# Patient Record
Sex: FEMALE | Race: OTHER | HISPANIC OR LATINO | Employment: UNEMPLOYED | URBAN - METROPOLITAN AREA
[De-identification: names, ages, dates, MRNs, and addresses within clinical notes are randomized per-mention and may not be internally consistent; named-entity substitution may affect disease eponyms.]

---

## 2019-07-12 ENCOUNTER — OFFICE VISIT (OUTPATIENT)
Dept: URGENT CARE | Age: 29
End: 2019-07-12
Payer: COMMERCIAL

## 2019-07-12 VITALS
HEIGHT: 65 IN | WEIGHT: 136 LBS | DIASTOLIC BLOOD PRESSURE: 65 MMHG | SYSTOLIC BLOOD PRESSURE: 121 MMHG | HEART RATE: 73 BPM | TEMPERATURE: 97.8 F | RESPIRATION RATE: 16 BRPM | OXYGEN SATURATION: 100 % | BODY MASS INDEX: 22.66 KG/M2

## 2019-07-12 DIAGNOSIS — R59.1 LYMPHADENOPATHY: Primary | ICD-10-CM

## 2019-07-12 PROCEDURE — G0382 LEV 3 HOSP TYPE B ED VISIT: HCPCS | Performed by: PHYSICIAN ASSISTANT

## 2019-07-12 PROCEDURE — 99203 OFFICE O/P NEW LOW 30 MIN: CPT | Performed by: PHYSICIAN ASSISTANT

## 2019-07-12 PROCEDURE — 99283 EMERGENCY DEPT VISIT LOW MDM: CPT | Performed by: PHYSICIAN ASSISTANT

## 2019-07-12 RX ORDER — MOMETASONE FUROATE 1 MG/G
CREAM TOPICAL DAILY PRN
Qty: 45 G | Refills: 1 | Status: SHIPPED | OUTPATIENT
Start: 2019-07-12 | End: 2021-09-03

## 2019-07-13 NOTE — PATIENT INSTRUCTIONS
Continue monitor symptoms  If swelling under left arm continues for 1-2 weeks, follow-up due to family doctor OBGYN  Go to ER if new or severe symptoms occur    Linfadenopatía   LO QUE NECESITA SABER:   La linfoadenopatía es ketan inflamación en racquel ganglios linfáticos  Los ganglios linfáticos son [place] pequeños que son parte de guthrie sistema inmunológico  Los ganglios linfáticos esta localizados por todo guthrie cuerpo  Se pueden sentir mejor en guthrie raven, debajo del guthrie brazos y cerca de guthrie florian  Linfoadenopatía puede ocurir en jacobo o más áreas de guthrie cuerpo  Generalmente es causada por ketan infección  [facility]CCIONES [facility]:   Regrese a la deanne de emergencias si:   · Los gánglios linfáticos inflamados están sangrando  · Los gánglios linfáticos inflamados en el raven afectan la respiración o habilidad para tragar  Pregúntele a guthrie Abbott Foyer vitaminas y minerales son adecuados para usted  · Usted tiene fiebre  · Usted tiene ketan nueva inflamación o dolor en los ganglíos linfáticos  · Usted tiene un sarpullido  · Racquel gánglios linfáticos permanecen inflamados y dolorosos o se ahn agrandado  · Racquel gánglios linfáticos tienen líneas fregoso a guthrie alrededor, o la piel alrededor de los gánglios esta enrojecida  · Usted tiene preguntas o inquietudes acerca de guthrie condición o cuidado  Acuda a racquel consultas de control con guthrie médico según le indicaron  Anote racquel preguntas para que se acuerde de hacerlas drake racquel visitas  Cuidados personales:   · No empuje ni apriete  los gánglios linfáticos que están inflamados  · Aplique calor sobre los ganglios linfáticos  Puede usar compresas tibias, o ketan almohadilla eléctrica a ketan temperatura baja  · Descanse tanto keny sea necesario  Si tiene fiebre, descanse hasta que guthrie temperatura se haya normalizado  Regrese a racquel actividades normales diarias lentamente después que haya suspendido guthrie fiebre    © 2017 [facility] is for End User's use only and may not be sold, redistributed or otherwise used for commercial purposes  All illustrations and images included in CareNotes® are the copyrighted property of A D A M , Inc  or Jhoan Delgado  Esta información es sólo para uso en educación  Gutrhie intención no es darle un consejo médico sobre enfermedades o tratamientos  Colsulte con guthrie Alben Arin farmacéutico antes de seguir cualquier régimen médico para saber si es seguro y efectivo para usted

## 2019-07-13 NOTE — PROGRESS NOTES
[facility]        NAME: Ben Galarza is a 30 y o  female  : 1990    MRN: [id number]  DATE: 2019  TIME: 8:30 PM    Assessment and Plan   Lymphadenopathy [R59 1]  1  Lymphadenopathy  mometasone (ELOCON) 0 1 % cream       Per patient, swelling and tenderness of lymph node in axilla has decreased substantially over the past few days  I educated patient that if lymph node last longer than 2 weeks, if it grows substantially, if she notices any pain or mass and breast, or any new concerning symptoms occur, follow up with her family doctor her OBGYN  I educated patient on warning signs such as firm nontender non mobile or lymph nodes that last a long time  Patient states she understands and agrees to follow up with family doctor or OBGYN  Patient Instructions       Take medications as directed  Drink plenty of fluids  Follow up with family doctor this week  Go to ER immediately if new or worsening symptoms occur  Chief Complaint     Chief Complaint   Patient presents with    Mass     PT states she feels a lump under her left armpit for about 5 days; has not tried any ointments, heat or ice  History of Present Illness       Five days ago, patient noticed a marble-sized painful swelling to left axilla  Patient states that she believes that it was deep under the skin, not abscess or a pimple  Patient states she has a history of ingrown hairs in this is nothing like that  Patient states that it was tender, mobile  Patient denies any fevers or chills  Patient denies any URI symptoms  Patient denies any infection, mass, or pain to the breasts  Patient states that in the past day to 2 days, the swelling has decreased significantly and now she has no pain  She states that it is barely palpable  Patient has done nothing to make this better    Patient can't think of nothing that makes a this worse    Patient also has a history of eczema and she needs a refill for mometasone      Review of Systems   Review of Systems   Constitutional: Negative for chills, diaphoresis, fatigue and fever  HENT: Negative for congestion, ear pain, rhinorrhea, sinus pressure and voice change  Eyes: Negative  Respiratory: Negative for cough, chest tightness and shortness of breath  Cardiovascular: Negative for chest pain and palpitations  Gastrointestinal: Negative for abdominal pain, constipation, diarrhea, nausea and vomiting  Endocrine: Negative  Genitourinary: Negative for dysuria  Musculoskeletal: Negative for back pain, myalgias and neck pain  Skin: Positive for rash (eczema of hands bilateral)  Negative for pallor  Allergic/Immunologic: Negative  Neurological: Negative for dizziness, syncope and headaches  Hematological: Negative  Psychiatric/Behavioral: Negative  Current Medications       Current Outpatient Medications:     mometasone (ELOCON) 0 1 % cream, Apply topically daily as needed (Eczema), Disp: 45 g, Rfl: 1    Current Allergies     Allergies as of 07/12/2019    (No Known Allergies)            The following portions of the patient's history were reviewed and updated as appropriate: allergies, current medications, past family history, past medical history, past social history, past surgical history and problem list      Past Medical History:   Diagnosis Date    Tachycardia        History reviewed  No pertinent surgical history  History reviewed  No pertinent family history  Medications have been verified  Objective   /65   Pulse 73   Temp 97 8 °F (36 6 °C)   Resp 16   Ht 5' 5" (1 651 m)   Wt 61 7 kg (136 lb)   SpO2 100%   BMI 22 63 kg/m²        Physical Exam     Physical Exam   Constitutional: She appears well-developed and well-nourished  No distress  HENT:   Head: Normocephalic and atraumatic  Neck: Normal range of motion  Neck supple     Cardiovascular: Normal rate, regular rhythm, normal heart sounds and intact distal pulses  Pulmonary/Chest: Effort normal and breath sounds normal  No respiratory distress  She has no wheezes  She has no rales  Skin: Skin is warm  Capillary refill takes less than 2 seconds  No rash (Eczema to bilateral hands) noted  She is not diaphoretic  Small pea-sized tender mobile lymph node to left axilla area  No surrounding erythema or swelling  Nursing note and vitals reviewed

## 2020-01-21 ENCOUNTER — OFFICE VISIT (OUTPATIENT)
Dept: URGENT CARE | Age: 30
End: 2020-01-21
Payer: COMMERCIAL

## 2020-01-21 VITALS
OXYGEN SATURATION: 100 % | BODY MASS INDEX: 24.16 KG/M2 | WEIGHT: 145 LBS | TEMPERATURE: 97.6 F | RESPIRATION RATE: 16 BRPM | SYSTOLIC BLOOD PRESSURE: 105 MMHG | HEART RATE: 70 BPM | DIASTOLIC BLOOD PRESSURE: 66 MMHG | HEIGHT: 65 IN

## 2020-01-21 DIAGNOSIS — L30.9 ECZEMA, UNSPECIFIED TYPE: Primary | ICD-10-CM

## 2020-01-21 PROCEDURE — 99213 OFFICE O/P EST LOW 20 MIN: CPT | Performed by: PHYSICIAN ASSISTANT

## 2020-01-21 RX ORDER — METHYLPREDNISOLONE 4 MG/1
TABLET ORAL
Qty: 21 TABLET | Refills: 0 | Status: SHIPPED | OUTPATIENT
Start: 2020-01-21 | End: 2021-09-03

## 2020-01-22 NOTE — PATIENT INSTRUCTIONS
Continue to monitor symptoms  If new or worsening symptoms develop, go immediately to Er  Drink plenty of fluids  Follow up with Family Doctor this week  Eczema   LO QUE NECESITA SABER:   El eczema o dermatitis atópica, es un sarpullido carlos en la piel que causa comezón  Esta es ketan condición de larga duración que podría provocar brotes por el tammie de tipton vasquez  IN[facility]CCIONES [facility]:   Regrese a la deanne de emergencias si:   · Usted tiene fiebre o nota marimar rojizas subiendo por tipton Madelon Grass o pierna  · El sarpullido se ve más inflamado, carlos o caliente  Pregúntele a tipton Hennie Rise vitaminas y minerales son adecuados para usted  · La mayor parte de tipton piel está Ronda [place], [place], adolorida y [place] con escamas  · Usted desarrolla costras fregoso, sangrientas y dolorosas  · Tipton piel se ampolla y expulsa pus shultz o amarillo  · Tiene alguna pregunta acerca de tipton condición o cuidado  Medicamentos:   · Medicamentos, , keny los inmunosupresores, ayudan a aliviar la comezón, el enrojecimiento, el dolor y la inflamación  Se pueden administrar en forma de cremas o pastillas  Es probable que usted también reciba antihistamínicos para reducir la comezón o antibióticos si tiene ketan infección en la piel  · [place] natalie medicamentos keny se le haya indicado  Consulte con tipton médico si usted pantera que tipton medicamento no le está ayudando o si presenta efectos secundarios  Infórmele si es alérgico a algún medicamento  Mantenga ketan lista actualizada de los [place], las vitaminas y los productos herbales que dea  Incluya los siguientes datos de los medicamentos: cantidad, frecuencia y motivo de administración  Traiga con usted la lista o los envases de la píldoras a natalie citas de seguimiento  Lleve la lista de los medicamentos con usted en ivon de ketan emergencia  Controle el eczema:   · No se rasque  Dése palmaditas o presione la piel para aliviar la comezón   Los síntomas empeorarán si usted se rasca  Mantenga natalie uñas cortas para que no se desgarre la piel si se rasca  · Mantenga tipton piel húmeda  Aplique ketan loción, crema o pomada en tipton piel después de un baño o ketan ducha cuando la piel todavía está húmeda  Pregunte a tipton médico que usar y con qué frecuencia  · [place] ketan ducha o báñese  con agua tibia drake 10 minutos o menos  Use jabón suave  Pregúntele a tipton médico cuál jabón es adecuado para usted  · Use ropa de algodón  Use ropa holgada de algodón o mezclas de algodón  Evite la ropa de karissa  · Use un humidificador  para añadir humedad en el aire de tipton hogar  · Evite cambios de temperatura , especialmente actividades que le producen sudoración excesiva porque esto le puede ocasionar comezón  Retire las [email or web address] de tipton cama si usted se acalora mientras duerme  · Evite los alérgenos, polvos e irritantes de la piel  Las mascotas no deben ser permitidas dentro de tipton casa  No use perfume, suavizante de ropa o maquillaje que le produzca ketan sensación de ardor o comezón  Acuda a natalie consultas de control con tipton médico según le indicaron  Anote natalie preguntas para que se acuerde de hacerlas drake natalie visitas  © 2017 [address] [employer] Information is for End User's use only and may not be sold, redistributed or otherwise used for commercial purposes  All illustrations and images included in CareNotes® are the copyrighted property of A D A M , Inc  or Jhoan Delgado  Esta información es sólo para uso en educación  Tipton intención no es darle un consejo médico sobre enfermedades o tratamientos  Colsulte con tipton Yesi Clipper farmacéutico antes de seguir cualquier régimen médico para saber si es seguro y efectivo para usted

## 2020-01-22 NOTE — PROGRESS NOTES
[facility]        NAME: Adrian Martinez is a 30 y o  female  : 1990    MRN: [id number]  DATE: 2020  TIME: 8:21 PM    Assessment and Plan   Eczema, unspecified type [L30 9]  1  Eczema, unspecified type  methylPREDNISolone 4 MG tablet therapy pack    Ambulatory referral to Dermatology      used  I educated patient on use of topical lotions to improve symptoms  I educated patient on use of oral steroids  Patient referred to dermatology  I educated patient on indications to return to go to ER  Patient states she understands and agrees  Patient Instructions       Take medications as directed  Drink plenty of fluids  Follow up with family doctor this week  Go to ER immediately if new or worsening symptoms occur  Chief Complaint     Chief Complaint   Patient presents with    Rash     Pt stated she has rash on legs and arms and it is very itches severly  Pt stated she can not sleep  Pt does have exima and these newer symptoms started in November  History of Present Illness       Patient has been battling with eczema for years  Patient states that she has a flare up on the backs of her hands and her ankles that has been worsening over the past few weeks  Patient states that she has been using Aveeno and Desitin ointment  Patient states she uses them twice a day with no relief  Patient has previously been on topical and oral steroids which have helped symptoms  Patient has not followed up with a dermatologist   Patient states that there are no new or concerning symptoms, the symptoms are just very severe now  Patient states that when she put some ointments on her hands they burn so she is afraid to use it frequently  No fevers or chills  No surrounding erythema swelling or venous streaking  No pain with range of motion of hands  Review of Systems   Review of Systems   Constitutional: Negative for chills, diaphoresis, fatigue and fever     HENT: Negative for congestion, ear pain, rhinorrhea, sinus pressure and voice change  Eyes: Negative  Respiratory: Negative for cough, chest tightness and shortness of breath  Cardiovascular: Negative for chest pain and palpitations  Gastrointestinal: Negative for constipation, diarrhea, nausea and vomiting  Endocrine: Negative  Genitourinary: Negative for dysuria  Musculoskeletal: Negative for back pain, myalgias and neck pain  Skin: Positive for rash  Negative for pallor  Allergic/Immunologic: Negative  Neurological: Negative for dizziness, syncope and headaches  Hematological: Negative  Psychiatric/Behavioral: Negative  Current Medications       Current Outpatient Medications:     methylPREDNISolone 4 MG tablet therapy pack, Use as directed on package, Disp: 21 tablet, Rfl: 0    mometasone (ELOCON) 0 1 % cream, Apply topically daily as needed (Eczema) (Patient not taking: Reported on 1/21/2020), Disp: 45 g, Rfl: 1    Current Allergies     Allergies as of 01/21/2020    (No Known Allergies)            The following portions of the patient's history were reviewed and updated as appropriate: allergies, current medications, past family history, past medical history, past social history, past surgical history and problem list      Past Medical History:   Diagnosis Date    Tachycardia        No past surgical history on file  No family history on file  Medications have been verified  Objective   /66   Pulse 70   Temp 97 6 °F (36 4 °C)   Resp 16   Ht 5' 5" (1 651 m)   Wt 65 8 kg (145 lb)   SpO2 100%   BMI 24 13 kg/m²        Physical Exam     Physical Exam   Constitutional: She is oriented to person, place, and time  She appears well-developed and well-nourished  No distress  HENT:   Head: Normocephalic and atraumatic  Cardiovascular: Normal rate, regular rhythm, normal heart sounds and intact distal pulses     Pulmonary/Chest: Effort normal and breath sounds normal  No respiratory distress  She has no wheezes  She has no rales  Musculoskeletal: She exhibits no edema or deformity  Neurological: She is alert and oriented to person, place, and time  Skin: Skin is warm  Capillary refill takes less than 2 seconds  Rash (red raised itchy scaly rash to b/l hand dorsums and wrists with R > L and to ankles) noted  She is not diaphoretic  No pallor  Nursing note and vitals reviewed

## 2021-05-10 ENCOUNTER — IMMUNIZATIONS (OUTPATIENT)
Dept: FAMILY MEDICINE CLINIC | Facility: HOSPITAL | Age: 31
End: 2021-05-10

## 2021-05-10 DIAGNOSIS — Z23 ENCOUNTER FOR IMMUNIZATION: Primary | ICD-10-CM

## 2021-05-10 PROCEDURE — 0011A SARS-COV-2 / COVID-19 MRNA VACCINE (MODERNA) 100 MCG: CPT

## 2021-05-10 PROCEDURE — 91301 SARS-COV-2 / COVID-19 MRNA VACCINE (MODERNA) 100 MCG: CPT

## 2021-06-08 ENCOUNTER — IMMUNIZATIONS (OUTPATIENT)
Dept: FAMILY MEDICINE CLINIC | Facility: HOSPITAL | Age: 31
End: 2021-06-08

## 2021-06-08 DIAGNOSIS — Z23 ENCOUNTER FOR IMMUNIZATION: Primary | ICD-10-CM

## 2021-06-08 PROCEDURE — 0012A SARS-COV-2 / COVID-19 MRNA VACCINE (MODERNA) 100 MCG: CPT

## 2021-06-08 PROCEDURE — 91301 SARS-COV-2 / COVID-19 MRNA VACCINE (MODERNA) 100 MCG: CPT

## 2021-07-02 ENCOUNTER — OFFICE VISIT (OUTPATIENT)
Dept: CARDIOLOGY CLINIC | Facility: CLINIC | Age: 31
End: 2021-07-02
Payer: COMMERCIAL

## 2021-07-02 VITALS
DIASTOLIC BLOOD PRESSURE: 68 MMHG | WEIGHT: 150 LBS | HEIGHT: 65 IN | HEART RATE: 72 BPM | SYSTOLIC BLOOD PRESSURE: 112 MMHG | BODY MASS INDEX: 24.99 KG/M2 | OXYGEN SATURATION: 99 %

## 2021-07-02 DIAGNOSIS — I51.7 RIGHT VENTRICULAR DILATION: ICD-10-CM

## 2021-07-02 DIAGNOSIS — R00.0 TACHYCARDIA: Primary | ICD-10-CM

## 2021-07-02 PROCEDURE — 93000 ELECTROCARDIOGRAM COMPLETE: CPT | Performed by: INTERNAL MEDICINE

## 2021-07-02 PROCEDURE — 99244 OFF/OP CNSLTJ NEW/EST MOD 40: CPT | Performed by: INTERNAL MEDICINE

## 2021-07-02 PROCEDURE — 3008F BODY MASS INDEX DOCD: CPT | Performed by: INTERNAL MEDICINE

## 2021-07-02 NOTE — PROGRESS NOTES
Cardiology Office Note  MD Hamida Reyes MD Angie Hy, DO, [place] Libra Mansoor, MD Honor Lora, DO, Camila Jarvis DO, [facility] [place]  ----------------------------------------------------------------  [address], [address] [place] [address] 28 y o  female MRN: [id number]  Unit/Bed#:  Encounter: [id number]      History of Present Illness: It was a pleasure to see Cherylle Runner in the office today for initial CV evaluation  She has a past medical history of some form tachycardia, but she is unaware of the specifics  She established us in July 2021  Previously, she was seen by a cardiologist in the [facility]  Her cardiologist diagnosed her with "tachycardia "  She is unaware any diagnosis of supraventricular tachycardia or ventricular tachycardia  She reports that she has had tests many years ago including echocardiogram and Holter monitors which did not demonstrate any arrhythmia  She has continued to have palpitations on and off experiencing fluttering in her chest with the sensation of a rapid heart rate  She gets some associated lightheadedness  Palpitations seem to occur several times per week and lasts for seconds to minutes before they resolve  Denies family history of premature CAD or sudden cardiac death  She does state that her mother has diagnosis of some form cardiomyopathy  She is unaware of the specifics  She reports 1 episode of loss of consciousness in the past, but this was reportedly associated with carbon monoxide toxicity within the past year  In October 2020, she was seen in the emergency department at [facility] and her recurrent episodes of palpitations  She underwent CTA of the chest and there was no evidence of pulmonary embolism  Testing was unremarkable and she was found to be in sinus rhythm  She was subsequently discharged home   Denies lower extremity swelling, orthopnea or paroxysmal nocturnal dyspnea  Denies chest pain, pressure tightness or squeezing  Review of Systems:  Review of Systems   Constitutional: Negative for decreased appetite, fever, weight gain and weight loss  HENT: Negative for congestion and sore throat  Eyes: Negative for visual disturbance  Cardiovascular: Positive for palpitations  Negative for chest pain, dyspnea on exertion, leg swelling and near-syncope  Respiratory: Negative for cough and shortness of breath  Hematologic/Lymphatic: Negative for bleeding problem  Skin: Negative for rash  Musculoskeletal: Negative for myalgias and neck pain  Gastrointestinal: Negative for abdominal pain and nausea  Neurological: Positive for light-headedness  Negative for weakness  Psychiatric/Behavioral: Negative for depression  Past Medical History:   Diagnosis Date    Tachycardia        History reviewed  No pertinent surgical history  Social History     Socioeconomic History    Marital status: /Civil Union     Spouse name: None    Number of children: None    Years of education: None    Highest education level: None   Occupational History    None   Tobacco Use    Smoking status: Current Every Day Smoker     Types: Cigarettes    Smokeless tobacco: Never Used   Vaping Use    Vaping Use: Never used   Substance and Sexual Activity    Alcohol use: Yes    Drug use: Never    Sexual activity: None   Other Topics Concern    None   Social History Narrative    None     Social Determinants of Health     Financial Resource Strain:     Difficulty of Paying Living Expenses:    Food Insecurity:     Worried About Running Out of Food in the Last Year:     [address] in the Last Year:    Transportation Needs:     Lack of Transportation (Medical):      Lack of Transportation (Non-Medical):    Physical Activity:     Days of Exercise per Week:     Minutes of Exercise per Session:    Stress:     Feeling of Stress :    Social Connections:     Frequency of Communication with Friends and Family:     Frequency of Social Gatherings with Friends and Family:     Attends Judaism Services:     Active Member of Clubs or Organizations:     Attends Club or Organization Meetings:     Marital Status:    Intimate Partner Violence:     Fear of Current or Ex-Partner:     Emotionally Abused:     Physically Abused:     Sexually Abused:        History reviewed  No pertinent family history  No Known Allergies      Current Outpatient Medications:     Magnesium 400 MG CAPS, Take by mouth, Disp: , Rfl:     methylPREDNISolone 4 MG tablet therapy pack, Use as directed on package (Patient not taking: Reported on 7/2/2021), Disp: 21 tablet, Rfl: 0    mometasone (ELOCON) 0 1 % cream, Apply topically daily as needed (Eczema) (Patient not taking: Reported on 1/21/2020), Disp: 45 g, Rfl: 1    Vitals:    07/02/21 1616   BP: 112/68   BP Location: Left arm   Patient Position: Sitting   Cuff Size: Adult   Pulse: 72   SpO2: 99%   Weight: 68 kg (150 lb)   Height: 5' 5" (1 651 m)       PHYSICAL EXAMINATION:  Gen: Awake, Alert, NAD   Head/eyes: AT/NC, pupils equal and round, Anicteric  ENT: mmm  Neck: Supple, No elevated JVP, trachea midline  Resp: CTA bilaterally no w/r/r  CV: RRR +S1, S2, No m/r/g  Abd: Soft, NT/ND + BS  Ext: no LE edema bilaterally  Neuro: Follows commands, moves all extermities  Psych: Appropriate affect, normal mood, pleasant attitude, non-combative  Skin: warm; no rash, erythema or venous stasis changes on exposed skin    --------------------------------------------------------------------------------  TREADMILL STRESS  No results found for this or any previous visit      --------------------------------------------------------------------------------  NUCLEAR STRESS TEST: No results found for this or any previous visit      No results found for this or any previous visit       --------------------------------------------------------------------------------  CATH: No results found for this or any previous visit     --------------------------------------------------------------------------------  ECHO:   No results found for this or any previous visit  No results found for this or any previous visit     --------------------------------------------------------------------------------  HOLTER  No results found for this or any previous visit  No results found for this or any previous visit     --------------------------------------------------------------------------------  CAROTIDS  No results found for this or any previous visit      --------------------------------------------------------------------------------  ECGs:  No results found for this visit on 07/02/21  No results found for: WBC, HGB, HCT, MCV, PLT   No results found for: SODIUM, K, CL, CO2, BUN, CREATININE, GLUC, CALCIUM   No results found for: HGBA1C   No results found for: CHOL  No results found for: HDL  No results found for: LDLCALC  No results found for: TRIG  No results found for: CHOLHDL   No results found for: INR, PROTIME     1  Tachycardia  -     POCT ECG  -     Echo limited with contrast if indicated; Future; Expected date: 07/02/2021  -     AMB extended holter monitor; Future; Expected date: 07/02/2021    2  Right ventricular dilation  -     Echo limited with contrast if indicated; Future; Expected date: 07/02/2021        IMPRESSION:  · Tachycardia  · LVEF 55-60%, mild RV dilatation with normal function, trace MR, mild TR, trivial pericardial effusion, June 2021  · Family history of CVD in mother    PLAN:  It was a pleasure to see Brendan Weaver in the office today for initial CV evaluation  She is here today due to her episodes of palpitations with lightheadedness and prior diagnosis of "tachycardia "  She has no symptoms concerning for angina and no signs or symptoms of heart failure  She examines to be euvolemic in the office today  Blood pressure and heart rate have been stable    ECG is nonischemic showing sinus bradycardia  She continues to have palpitations on a weekly basis  She did have 1 episode of loss of consciousness, but this was not associated with her palpitations  Rather, this was associated with carbon monoxide poisoning  Due to her symptoms of palpitations, she was sent for echocardiogram showing normal left ventricular function with mild right ventricular dilatation and minimal valvular regurgitation  I have shared with her this news  Based on her clinical presentation, I have the following recommendations:    1  Check 2 week event recorder to assess for any evidence of arrhythmia  2  Will perform limited 2D echocardiogram to assess right ventricle and pulmonary pressures  Would like to further evaluate right ventricle to see if patient truly has mild dilatation  3  Continue current medications as prescribed  4  As always, I recommend a heart healthy diet low in sodium and exercise regimen  5  We will follow up with her after testing  As always, please do not hesitate to call with any questions  Portions of the record may have been created with voice recognition software  Occasional wrong word or "sound a like" substitutions may have occurred due to the inherent limitations of voice recognition software  Read the chart carefully and recognize, using context, where substitutions have occurred        Signed: Monty Kidd DO, Kreg Dural

## 2021-07-13 ENCOUNTER — TELEPHONE (OUTPATIENT)
Dept: CARDIOLOGY CLINIC | Facility: CLINIC | Age: 31
End: 2021-07-13

## 2021-07-13 NOTE — TELEPHONE ENCOUNTER
Pt states zio order and has not yet received  Checked th Zio  states is being shipped to patient  Called patient with info and she stated 30 minutes after calling it did arrive at her door  Pt now has monitor

## 2021-07-15 ENCOUNTER — HOSPITAL ENCOUNTER (OUTPATIENT)
Dept: NON INVASIVE DIAGNOSTICS | Facility: HOSPITAL | Age: 31
Discharge: HOME | End: 2021-07-15
Attending: INTERNAL MEDICINE
Payer: COMMERCIAL

## 2021-07-15 DIAGNOSIS — I51.7 RIGHT VENTRICULAR DILATION: ICD-10-CM

## 2021-07-15 DIAGNOSIS — R00.0 TACHYCARDIA: ICD-10-CM

## 2021-07-15 PROCEDURE — 93308 TTE F-UP OR LMTD: CPT | Performed by: INTERNAL MEDICINE

## 2021-07-15 PROCEDURE — 93321 DOPPLER ECHO F-UP/LMTD STD: CPT | Performed by: INTERNAL MEDICINE

## 2021-07-15 PROCEDURE — 93325 DOPPLER ECHO COLOR FLOW MAPG: CPT | Performed by: INTERNAL MEDICINE

## 2021-07-15 PROCEDURE — 93308 TTE F-UP OR LMTD: CPT

## 2021-08-24 ENCOUNTER — CLINICAL SUPPORT (OUTPATIENT)
Dept: CARDIOLOGY CLINIC | Facility: CLINIC | Age: 31
End: 2021-08-24
Payer: COMMERCIAL

## 2021-08-24 DIAGNOSIS — R00.0 TACHYCARDIA: ICD-10-CM

## 2021-08-24 PROCEDURE — 93244 EXT ECG>48HR<7D REV&INTERPJ: CPT | Performed by: INTERNAL MEDICINE

## 2021-09-03 ENCOUNTER — OFFICE VISIT (OUTPATIENT)
Dept: CARDIOLOGY CLINIC | Facility: CLINIC | Age: 31
End: 2021-09-03
Payer: COMMERCIAL

## 2021-09-03 VITALS
BODY MASS INDEX: 24.32 KG/M2 | WEIGHT: 146 LBS | DIASTOLIC BLOOD PRESSURE: 70 MMHG | SYSTOLIC BLOOD PRESSURE: 100 MMHG | HEART RATE: 80 BPM | HEIGHT: 65 IN

## 2021-09-03 DIAGNOSIS — I47.1 PSVT (PAROXYSMAL SUPRAVENTRICULAR TACHYCARDIA) (HCC): ICD-10-CM

## 2021-09-03 DIAGNOSIS — I51.7 RIGHT VENTRICULAR DILATION: Primary | ICD-10-CM

## 2021-09-03 DIAGNOSIS — Z82.49 FAMILY HISTORY OF CARDIAC DISORDER: ICD-10-CM

## 2021-09-03 DIAGNOSIS — I07.1 MODERATE TRICUSPID REGURGITATION: ICD-10-CM

## 2021-09-03 PROCEDURE — 99214 OFFICE O/P EST MOD 30 MIN: CPT | Performed by: INTERNAL MEDICINE

## 2021-09-03 NOTE — PROGRESS NOTES
Cardiology Office Note  MD April Youssef MD Joetta Pates, DO, MD Frantz Caal DO, Nir Pastor DO, [facility] [place]  ----------------------------------------------------------------  [address], [address] [place] [address] 28 y o  female MRN: [id number]  Unit/Bed#:  Encounter: [id number]      History of Present Illness: It was a pleasure to see Rabia Baker in the office today for follow-up CV evaluation  She has a past medical history of some form tachycardia, but she is unaware of the specifics  She established us in July 2021  Previously, she was seen by a cardiologist in the [facility]  Her cardiologist diagnosed her with "tachycardia "  She is unaware any diagnosis of supraventricular tachycardia or ventricular tachycardia  She reports that she has had tests many years ago including echocardiogram and Holter monitors which did not demonstrate any arrhythmia  She has continued to have palpitations on and off experiencing fluttering in her chest with the sensation of a rapid heart rate  She gets some associated lightheadedness  Palpitations seem to occur several times per week and lasts for seconds to minutes before they resolve  Denies family history of premature CAD or sudden cardiac death  She does state that her mother has diagnosis of some form cardiomyopathy  She is unaware of the specifics  She reports 1 episode of loss of consciousness in the past, but this was reportedly associated with carbon monoxide toxicity within the past year  In October 2020, she was seen in the emergency department at [facility] and her recurrent episodes of palpitations  She underwent CTA of the chest and there was no evidence of pulmonary embolism  Testing was unremarkable and she was found to be in sinus rhythm  She was subsequently discharged home   In June 2021, she underwent echocardiogram demonstrating mild right ventricular dilatation with mild tricuspid regurgitation  There was concern that this may be an overestimation of the right ventricular size and repeat echocardiogram was ordered  Also, with the patient's palpitations, we checked a event recorder monitor  She is here today to discuss the results  Denies lower extremity swelling, orthopnea or paroxysmal nocturnal dyspnea  Denies chest pain, pressure tightness or squeezing  Review of Systems:  Review of Systems   Constitutional: Negative for decreased appetite, fever, weight gain and weight loss  HENT: Negative for congestion and sore throat  Eyes: Negative for visual disturbance  Cardiovascular: Positive for palpitations  Negative for chest pain, dyspnea on exertion, leg swelling and near-syncope  Respiratory: Negative for cough and shortness of breath  Hematologic/Lymphatic: Negative for bleeding problem  Skin: Negative for rash  Musculoskeletal: Negative for myalgias and neck pain  Gastrointestinal: Negative for abdominal pain and nausea  Neurological: Positive for light-headedness  Negative for weakness  Psychiatric/Behavioral: Negative for depression  Past Medical History:   Diagnosis Date    Tachycardia        No past surgical history on file  Social History     Socioeconomic History    Marital status: /Civil Union     Spouse name: Not on file    Number of children: Not on file    Years of education: Not on file    Highest education level: Not on file   Occupational History    Not on file   Tobacco Use    Smoking status: Current Every Day Smoker     Types: Cigarettes    Smokeless tobacco: Never Used   Vaping Use    Vaping Use: Never used   Substance and Sexual Activity    Alcohol use:  Yes    Drug use: Never    Sexual activity: Not on file   Other Topics Concern    Not on file   Social History Narrative    Not on file     Social Determinants of Health     Financial Resource Strain:     Difficulty of Paying Living Expenses:    Food Insecurity:     Worried About [facility] in the Last Year:     [facility] in the Last Year:    Transportation Needs:     Lack of Transportation (Medical):  Lack of Transportation (Non-Medical):    Physical Activity:     Days of Exercise per Week:     Minutes of Exercise per Session:    Stress:     Feeling of Stress :    Social Connections:     Frequency of Communication with Friends and Family:     Frequency of Social Gatherings with Friends and Family:     Attends Congregation Services:     Active Member of Clubs or Organizations:     Attends Club or Organization Meetings:     Marital Status:    Intimate Partner Violence:     Fear of Current or Ex-Partner:     Emotionally Abused:     Physically Abused:     Sexually Abused:        No family history on file  No Known Allergies      Current Outpatient Medications:     COLLAGEN-VITAMIN C PO, Take by mouth daily 3 tablets a day, Disp: , Rfl:     Magnesium 400 MG CAPS, Take by mouth, Disp: , Rfl:     triamcinolone (KENALOG) 0 1 % ointment, daily, Disp: , Rfl:     Vitals:    09/03/21 1634   BP: 100/70   BP Location: Right arm   Patient Position: Sitting   Cuff Size: Adult   Pulse: 80   Weight: 66 2 kg (146 lb)   Height: 5' 5" (1 651 m)       PHYSICAL EXAMINATION:  Gen: Awake, Alert, NAD   Head/eyes: AT/NC, pupils equal and round, Anicteric  ENT: mmm  Neck: Supple, No elevated JVP, trachea midline  Resp: CTA bilaterally no w/r/r  CV: RRR +S1, S2, No m/r/g  Abd: Soft, NT/ND + BS  Ext: no LE edema bilaterally  Neuro: Follows commands, moves all extermities  Psych: Appropriate affect, normal mood, pleasant attitude, non-combative  Skin: warm; no rash, erythema or venous stasis changes on exposed skin    --------------------------------------------------------------------------------  TREADMILL STRESS  No results found for this or any previous visit  --------------------------------------------------------------------------------  NUCLEAR STRESS TEST: No results found for this or any previous visit  No results found for this or any previous visit       --------------------------------------------------------------------------------  CATH:  No results found for this or any previous visit     --------------------------------------------------------------------------------  ECHO:   No results found for this or any previous visit  No results found for this or any previous visit     --------------------------------------------------------------------------------  HOLTER  No results found for this or any previous visit  No results found for this or any previous visit     --------------------------------------------------------------------------------  CAROTIDS  No results found for this or any previous visit      --------------------------------------------------------------------------------  ECGs:  No results found for this visit on 09/03/21  No results found for: WBC, HGB, HCT, MCV, PLT   No results found for: SODIUM, K, CL, CO2, BUN, CREATININE, GLUC, CALCIUM   No results found for: HGBA1C   No results found for: CHOL  No results found for: HDL  No results found for: LDLCALC  No results found for: TRIG  No results found for: CHOLHDL   No results found for: INR, PROTIME     1  Right ventricular dilation    2  PSVT (paroxysmal supraventricular tachycardia) ([address] )    3  Moderate tricuspid regurgitation    4   Family history of cardiac disorder        IMPRESSION:  · Lightheadedness  · Sinus tachycardia  · Palpitations  · LVEF 55-60%, mild RV dilatation with normal function, trace MR, mild TR, trivial pericardial effusion, June 2021  · Mild RV dilatation with preserved function, mild to moderate TR, trace IN w/ PASP 31 mmHg by echo, July 2021  · Event recorder w/ SR avg HR 84 bpm, rare APCs/couplets, rare VPCs/couplets, nonsustained PSVT  (x2) longest/fastest was 5 beats at 154 bpm, triggered event correlate with sinus rhythm to sinus tachycardia and rarely with nonsustained PSVT, August 2021  · CTA chest, negative for pulmonary embolism, November 2020  · Family history of CVD in mother    PLAN:  It was a pleasure to see Krystyna Calvo in the office today for follow-up CV evaluation  She is here today to discuss the results of her echocardiogram and event recorder  The echocardiogram demonstrated mild right ventricular dilatation  Mild to moderate tricuspid regurgitation was noted with normal pulmonary pressures  Event recorder demonstrated sinus rhythm with rare APCs/VPCs and 2 runs of nonsustained PSVT  Triggered events correlated almost entirely with sinus rhythm sinus tachycardia and with one episode of nonsustained PSVT  CTA of the chest demonstrated no evidence of pulmonary embolism in November 2020 following the onset of her symptoms which have been unchanged  Blood pressure and heart rate are currently stable in the office today  She has no symptoms concerning for angina and no signs or symptoms of heart failure  She examines to be euvolemic in the office today  Based on her clinical presentation, I have the following recommendations:    1  Recommend cardiac MRI to further evaluate right ventricle for any evidence of ARVD  2  Would place referral for sleep medicine evaluation  3  If MRI is unremarkable, would recommend pushing up her physical activity to build cardiovascular endurance  4   Stay well hydrated and liberalize salt intake  5  Check blood work including TSH and NT proBNP as well as metabolic panel and blood counts  6  Should her symptoms worsen in frequency or severity or change in quality, I would recommend she seek immediate medical attention/dial   7  We have discussed the possibility of initiating beta-blocker to help with symptoms, but patient would like to hold off for now  8    We will follow up with her after testing  As always, please do not hesitate to call with any questions  Portions of the record may have been created with voice recognition software  Occasional wrong word or "sound a like" substitutions may have occurred due to the inherent limitations of voice recognition software  Read the chart carefully and recognize, using context, where substitutions have occurred        Signed: Porfirio Smyth DO, Karyn Ped

## 2021-11-15 ENCOUNTER — TELEPHONE (OUTPATIENT)
Dept: CARDIOLOGY CLINIC | Facility: CLINIC | Age: 31
End: 2021-11-15

## 2022-08-12 ENCOUNTER — OFFICE VISIT (OUTPATIENT)
Dept: URGENT CARE | Facility: MEDICAL CENTER | Age: 32
End: 2022-08-12
Payer: COMMERCIAL

## 2022-08-12 ENCOUNTER — OFFICE VISIT (OUTPATIENT)
Dept: URGENT CARE | Age: 32
End: 2022-08-12
Payer: COMMERCIAL

## 2022-08-12 VITALS
HEART RATE: 78 BPM | SYSTOLIC BLOOD PRESSURE: 110 MMHG | OXYGEN SATURATION: 99 % | TEMPERATURE: 97.8 F | DIASTOLIC BLOOD PRESSURE: 70 MMHG | RESPIRATION RATE: 16 BRPM

## 2022-08-12 VITALS
SYSTOLIC BLOOD PRESSURE: 119 MMHG | RESPIRATION RATE: 16 BRPM | BODY MASS INDEX: 24.11 KG/M2 | HEIGHT: 66 IN | HEART RATE: 72 BPM | DIASTOLIC BLOOD PRESSURE: 67 MMHG | WEIGHT: 150 LBS | OXYGEN SATURATION: 100 % | TEMPERATURE: 98 F

## 2022-08-12 DIAGNOSIS — Z02.4 DRIVER'S PERMIT PE (PHYSICAL EXAMINATION): Primary | ICD-10-CM

## 2022-08-12 DIAGNOSIS — R60.0 PEDAL EDEMA: Primary | ICD-10-CM

## 2022-08-12 PROCEDURE — 99213 OFFICE O/P EST LOW 20 MIN: CPT | Performed by: EMERGENCY MEDICINE

## 2022-08-12 NOTE — PATIENT INSTRUCTIONS
Edema   LO QUE NECESITA SABER:   El edema es la inflamación por todo guthrie cuerpo  El edema usualmente es ketan señal de que usted esta reteniendo líquidos  La inflamación podía ser a causa de insuficiencia cardíaca o renal, tiroides, o enfermedad del hígado  También podría ser a causa de [place] Antoinette antidepresivos, medicamentos para la presión arterial u hormonas  La inflamación repentina alrededor de los labios o la michael podría ser un signo de ketan reacción alérgica severa  La inflamación de un brazo o ketan pierna podría deberse a ketan obstrucción en natalie venas  INSTRUCCIONES SOBRE [facility]:   Regrese a la deanne de emergencias si:  Usted tiene falta de aire mientras está en reposo, especialmente cuando se acuesta  Escupe flema rosada y espumosa cuando tose    Usted tiene dolor en el pecho  Guthrie latido cardíaco es rápido o irregular  Llame a guthrie médico si:  El área inflamada se siente fría y está pálida o de color aidan  El área inflamada se siente cálida, [place] y se ve de color carlos  Usted tiene más inflamación o inflamación en otras partes de guthrie cuerpo  Usted tiene preguntas o inquietudes acerca de guthrie condición o cuidado  Medicamentos:  Los medicamentos ayudan a eliminar el líquido corporal adicional      [place] natalie medicamentos keny se le haya indicado  Consulte con guthrie médico si usted pantera que guthrie medicamento no le está ayudando o si presenta efectos secundarios  Infórmele si es alérgico a cualquier medicamento  Mantenga ketan lista actualizada de los [place], las vitaminas y los productos herbales que dea  Incluya los siguientes datos de los medicamentos: cantidad, frecuencia y motivo de administración  Traiga con usted la lista o los envases de las píldoras a natalie citas de seguimiento  Lleve la lista de los medicamentos con usted en ivon de ketan emergencia  Controle el edema:  Eleve natalie brazos o piernas keny se le indique   Elévelos por encima del nivel de guthrie corazón con la mayor frecuencia posible  [place] va a disminuir inflamación y el dolor  Apóyelos sobre almohadas o cobijas para mantenerlos elevados cómodamente  Use medias de compresión keny se le indique  Las medias son ajustadas y ejercen presión a natalie piernas  [place] ayuda a evitar que el líquido se acumule en natalie piernas o tobillos  Limite el consumo de sal  La sal provoca que guthrie cuerpo retenga agua  Pregunte acerca de cualquier otro cambio para guthrie dieta  Manténgase activo  No esté de pie o sentado por [employer]  Pregunte a guthrie médico acerca del mejor plan de ejercicio para usted  Mantenga guthrie piel humectada usando loción, crema o pomada  Pregunte a guthrie médico que usar y con qué frecuencia  Acuda a la consulta de control con guthrie médico según las indicaciones: Anote natalie preguntas para que se acuerde de hacerlas drake natalie visitas  © Copyright [employer] 2022 Information is for End User's use only and may not be sold, redistributed or otherwise used for commercial purposes  All illustrations and images included in CareNotes® are the copyrighted property of [employer]  or [address] [place] [address] es sólo para uso en educación  Guthrie intención no es darle un consejo médico sobre enfermedades o tratamientos  Colsulte con guthrie Cele Rafydon farmacéutico antes de seguir cualquier régimen médico para saber si es seguro y efectivo para usted

## 2022-08-12 NOTE — PROGRESS NOTES
[facility]        NAME: Simona Garcia is a 33 y o  female  : 1990    MRN: [id number]  DATE: 2022  TIME: 3:23 PM    Assessment and Plan   Pedal edema [R60 0]  1  Pedal edema           Patient Instructions       Follow up with PCP in 3-5 days  Proceed to  ER if symptoms worsen  Chief Complaint     Chief Complaint   Patient presents with    Foot Swelling     Patient relates started with bilateral swelling to feet x4 days  Denies chest pain and SOB  States "I have hx  Of varicose veins not sure if it is related  I spend a lot of time on my feet all day at home cleaning  I was sitting down for a long time studying for the 's permit test"         History of Present Illness       32 y/o female presents today for evaluation of bilateral feet swelling off and on for several months  She states when she elevates her feet, it gets better but keeps coming back  She has a history of 'tachycardia'  Upon review of her medical record, she saw cardiology last year and had some episodes of NSVT on holter and was recommended to have a cardiac MRI for evaluation of an enlarged right ventricle but she never had this test done and never followed up with cardiology  She denies chest pain  She states she gets palpitations and short of breath when she's stressed  Review of Systems   Review of Systems   Constitutional: Negative for chills, fatigue and fever  HENT: Negative for postnasal drip, sore throat and trouble swallowing  Respiratory: Negative for chest tightness and shortness of breath  Cardiovascular: Positive for palpitations and leg swelling  Gastrointestinal: Negative for abdominal pain  Genitourinary: Negative for dysuria  Musculoskeletal: Negative for back pain  Skin: Negative for rash  Allergic/Immunologic: Negative for immunocompromised state  Neurological: Negative for dizziness, light-headedness and headaches     Psychiatric/Behavioral: Negative for confusion  Current Medications       Current Outpatient Medications:     Magnesium 400 MG CAPS, Take by mouth, Disp: , Rfl:     COLLAGEN-VITAMIN C PO, Take by mouth daily 3 tablets a day (Patient not taking: Reported on 8/12/2022), Disp: , Rfl:     triamcinolone (KENALOG) 0 1 % ointment, daily (Patient not taking: Reported on 8/12/2022), Disp: , Rfl:     Current Allergies     Allergies as of 08/12/2022 - Reviewed 08/12/2022   Allergen Reaction Noted    Aspirin Other (See Comments) 08/12/2022            The following portions of the patient's history were reviewed and updated as appropriate: allergies, current medications, past family history, past medical history, past social history, past surgical history and problem list      Past Medical History:   Diagnosis Date    Migraines     Tachycardia        History reviewed  No pertinent surgical history  No family history on file  Medications have been verified  Objective   /67   Pulse 72   Temp 98 °F (36 7 °C) (Tympanic)   Resp 16   Ht 5' 6" (1 676 m)   Wt 68 kg (150 lb)   LMP 07/28/2022   SpO2 100%   BMI 24 21 kg/m²        Physical Exam     Physical Exam  Vitals and nursing note reviewed  Constitutional:       Appearance: Normal appearance  She is not ill-appearing  HENT:      Head: Normocephalic and atraumatic  Right Ear: Tympanic membrane, ear canal and external ear normal       Left Ear: Tympanic membrane, ear canal and external ear normal       Nose: Nose normal       Mouth/Throat:      Mouth: Mucous membranes are moist    Eyes:      Extraocular Movements: Extraocular movements intact  Pupils: Pupils are equal, round, and reactive to light  Cardiovascular:      Rate and Rhythm: Normal rate and regular rhythm  Pulses: Normal pulses  Pulmonary:      Effort: Pulmonary effort is normal       Breath sounds: Normal breath sounds  Musculoskeletal:      Cervical back: Normal range of motion        Comments: Trace pedal edema bilaterally   Skin:     General: Skin is warm and dry  Capillary Refill: Capillary refill takes less than 2 seconds  Neurological:      General: No focal deficit present  Mental Status: She is alert and oriented to person, place, and time     Psychiatric:         Mood and Affect: Mood normal          Behavior: Behavior normal

## 2022-08-15 DIAGNOSIS — I47.1 PSVT (PAROXYSMAL SUPRAVENTRICULAR TACHYCARDIA): ICD-10-CM

## 2022-08-15 DIAGNOSIS — I51.7 RIGHT VENTRICULAR DILATION: Primary | ICD-10-CM

## 2022-08-15 DIAGNOSIS — I07.1 MODERATE TRICUSPID REGURGITATION: ICD-10-CM

## 2022-08-16 NOTE — PROGRESS NOTES
[facility]        NAME: James Shoemaker is a 33 y o  female  : 1990    MRN: [id number]  DATE: 2022  TIME: 9:32 AM    Assessment and Plan   's permit PE (physical examination) [Z02 4]  1  's permit PE (physical examination)       Patient presents for a drivers physical  Discussed PMH in depth  She was previously eval by PCP and cardiology for palpitations  She states she had a holter monitor done and was told at times she had a fast heart beat  She saw cardiology who told her it was prob stress related   She has not had any episodes in a year  Discussed importance of cardiology f/u  Reviewed previous visit and recommendation for further diagnostic testing based on family history  Patient was not aware and states she would go  Patient Instructions       Follow up with PCP and cardiology    Chief Complaint     Chief Complaint   Patient presents with    Annual Exam     Drivers permit         History of Present Illness       Patient presents for a drivers physical  Discussed PMH in depth  She was previously eval by PCP and cardiology for palpitations  She states she had a holter monitor done and was told at times she had a fast heart beat  She saw cardiology who told her it was prob stress related   She has not had any episodes in a year  Discussed importance of cardiology f/u  Reviewed previous visit and recommendation for further diagnostic testing based on family history  Patient was not aware and states she would go  Review of Systems   Review of Systems   Constitutional: Negative for chills and fever  HENT: Negative for hearing loss  Eyes: Negative for pain and visual disturbance  Respiratory: Negative for apnea, shortness of breath and wheezing  Cardiovascular: Negative for chest pain and palpitations  Gastrointestinal: Negative for nausea and vomiting  Endocrine: Negative for polydipsia  Musculoskeletal: Negative for arthralgias and myalgias  Neurological: Negative for dizziness, seizures, syncope and headaches  Psychiatric/Behavioral: Negative for behavioral problems and suicidal ideas  All other systems reviewed and are negative  Current Medications       Current Outpatient Medications:     COLLAGEN-VITAMIN C PO, Take by mouth daily 3 tablets a day (Patient not taking: Reported on 8/12/2022), Disp: , Rfl:     Magnesium 400 MG CAPS, Take by mouth, Disp: , Rfl:     triamcinolone (KENALOG) 0 1 % ointment, daily (Patient not taking: Reported on 8/12/2022), Disp: , Rfl:     Current Allergies     Allergies as of 08/12/2022 - Reviewed 08/12/2022   Allergen Reaction Noted    Aspirin Other (See Comments) 08/12/2022            The following portions of the patient's history were reviewed and updated as appropriate: allergies, current medications, past family history, past medical history, past social history, past surgical history and problem list      Past Medical History:   Diagnosis Date    Migraines     Tachycardia        No past surgical history on file  No family history on file  Medications have been verified  Objective   /70   Pulse 78   Temp 97 8 °F (36 6 °C)   Resp 16   LMP 07/28/2022   SpO2 99%   Patient's last menstrual period was 07/28/2022  Physical Exam     Physical Exam  Vitals reviewed  Constitutional:       General: She is not in acute distress  Appearance: Normal appearance  She is not ill-appearing  HENT:      Head: Normocephalic and atraumatic  Eyes:      Extraocular Movements: Extraocular movements intact  Pupils: Pupils are equal, round, and reactive to light  Cardiovascular:      Rate and Rhythm: Normal rate and regular rhythm  Pulses: Normal pulses  Heart sounds: Normal heart sounds  No murmur heard  Pulmonary:      Effort: Pulmonary effort is normal  No respiratory distress  Breath sounds: Normal breath sounds     Musculoskeletal:         General: No swelling, tenderness or deformity  Normal range of motion  Cervical back: Normal range of motion  Neurological:      General: No focal deficit present  Mental Status: She is alert  Mental status is at baseline  Cranial Nerves: No cranial nerve deficit  Psychiatric:         Mood and Affect: Mood normal          Behavior: Behavior normal          Thought Content:  Thought content normal

## 2022-08-25 ENCOUNTER — HOSPITAL ENCOUNTER (OUTPATIENT)
Dept: MRI IMAGING | Facility: HOSPITAL | Age: 32
Discharge: HOME | End: 2022-08-25
Payer: COMMERCIAL

## 2022-08-25 DIAGNOSIS — I51.7 RIGHT VENTRICULAR DILATION: ICD-10-CM

## 2022-08-25 DIAGNOSIS — I07.1 MODERATE TRICUSPID REGURGITATION: ICD-10-CM

## 2022-08-25 DIAGNOSIS — I47.1 PSVT (PAROXYSMAL SUPRAVENTRICULAR TACHYCARDIA) (HCC): ICD-10-CM

## 2022-08-25 PROCEDURE — G1004 CDSM NDSC: HCPCS

## 2022-08-25 PROCEDURE — A9585 GADOBUTROL INJECTION: HCPCS | Performed by: INTERNAL MEDICINE

## 2022-08-25 PROCEDURE — 75561 CARDIAC MRI FOR MORPH W/DYE: CPT

## 2022-08-25 RX ADMIN — GADOBUTROL 12 ML: 604.72 INJECTION INTRAVENOUS at 20:20

## 2022-09-06 ENCOUNTER — TELEPHONE (OUTPATIENT)
Dept: CARDIOLOGY CLINIC | Facility: CLINIC | Age: 32
End: 2022-09-06

## 2022-09-06 NOTE — TELEPHONE ENCOUNTER
Patient call this afternoon requesting new order for Blood Work  Patient have an appointment on 09/23  Needs to do the blood work before that date

## 2022-09-08 DIAGNOSIS — I47.1 PSVT (PAROXYSMAL SUPRAVENTRICULAR TACHYCARDIA): ICD-10-CM

## 2022-09-08 DIAGNOSIS — I51.7 RIGHT VENTRICULAR DILATION: Primary | ICD-10-CM

## 2022-09-09 NOTE — TELEPHONE ENCOUNTER
Placed call to patient to let her know blood work has been ordered  She verbalized understanding and would like to thank Dr Ludy Finn for doing so

## 2022-12-01 ENCOUNTER — APPOINTMENT (OUTPATIENT)
Dept: LAB | Age: 32
End: 2022-12-01

## 2022-12-01 DIAGNOSIS — I51.7 RIGHT VENTRICULAR DILATION: ICD-10-CM

## 2022-12-01 LAB
ALBUMIN SERPL BCP-MCNC: 3.9 G/DL (ref 3.5–5)
ALP SERPL-CCNC: 61 U/L (ref 46–116)
ALT SERPL W P-5'-P-CCNC: 21 U/L (ref 12–78)
ANION GAP SERPL CALCULATED.3IONS-SCNC: 5 MMOL/L (ref 4–13)
AST SERPL W P-5'-P-CCNC: 22 U/L (ref 5–45)
BASOPHILS # BLD AUTO: 0.04 THOUSANDS/ÂΜL (ref 0–0.1)
BASOPHILS NFR BLD AUTO: 1 % (ref 0–1)
BILIRUB SERPL-MCNC: 0.55 MG/DL (ref 0.2–1)
BUN SERPL-MCNC: 9 MG/DL (ref 5–25)
CALCIUM SERPL-MCNC: 9.4 MG/DL (ref 8.3–10.1)
CHLORIDE SERPL-SCNC: 105 MMOL/L (ref 96–108)
CO2 SERPL-SCNC: 25 MMOL/L (ref 21–32)
CREAT SERPL-MCNC: 0.46 MG/DL (ref 0.6–1.3)
EOSINOPHIL # BLD AUTO: 0.16 THOUSAND/ÂΜL (ref 0–0.61)
EOSINOPHIL NFR BLD AUTO: 2 % (ref 0–6)
ERYTHROCYTE [DISTWIDTH] IN BLOOD BY AUTOMATED COUNT: 11.6 % (ref 11.6–15.1)
GFR SERPL CREATININE-BSD FRML MDRD: 132 ML/MIN/1.73SQ M
GLUCOSE P FAST SERPL-MCNC: 87 MG/DL (ref 65–99)
HCT VFR BLD AUTO: 37.7 % (ref 34.8–46.1)
HGB BLD-MCNC: 12.3 G/DL (ref 11.5–15.4)
IMM GRANULOCYTES # BLD AUTO: 0.02 THOUSAND/UL (ref 0–0.2)
IMM GRANULOCYTES NFR BLD AUTO: 0 % (ref 0–2)
LYMPHOCYTES # BLD AUTO: 2.59 THOUSANDS/ÂΜL (ref 0.6–4.47)
LYMPHOCYTES NFR BLD AUTO: 36 % (ref 14–44)
MCH RBC QN AUTO: 31 PG (ref 26.8–34.3)
MCHC RBC AUTO-ENTMCNC: 32.6 G/DL (ref 31.4–37.4)
MCV RBC AUTO: 95 FL (ref 82–98)
MONOCYTES # BLD AUTO: 0.58 THOUSAND/ÂΜL (ref 0.17–1.22)
MONOCYTES NFR BLD AUTO: 8 % (ref 4–12)
NEUTROPHILS # BLD AUTO: 3.77 THOUSANDS/ÂΜL (ref 1.85–7.62)
NEUTS SEG NFR BLD AUTO: 53 % (ref 43–75)
NRBC BLD AUTO-RTO: 0 /100 WBCS
NT-PROBNP SERPL-MCNC: 36 PG/ML
PLATELET # BLD AUTO: 259 THOUSANDS/UL (ref 149–390)
PMV BLD AUTO: 11.3 FL (ref 8.9–12.7)
POTASSIUM SERPL-SCNC: 3.8 MMOL/L (ref 3.5–5.3)
PROT SERPL-MCNC: 7.1 G/DL (ref 6.4–8.4)
RBC # BLD AUTO: 3.97 MILLION/UL (ref 3.81–5.12)
SODIUM SERPL-SCNC: 135 MMOL/L (ref 135–147)
TSH SERPL DL<=0.05 MIU/L-ACNC: 1.95 UIU/ML (ref 0.45–4.5)
WBC # BLD AUTO: 7.16 THOUSAND/UL (ref 4.31–10.16)

## 2022-12-01 PROCEDURE — 80053 COMPREHEN METABOLIC PANEL: CPT | Performed by: INTERNAL MEDICINE

## 2022-12-01 PROCEDURE — 85025 COMPLETE CBC W/AUTO DIFF WBC: CPT | Performed by: INTERNAL MEDICINE

## 2022-12-01 PROCEDURE — 84443 ASSAY THYROID STIM HORMONE: CPT | Performed by: INTERNAL MEDICINE

## 2022-12-01 PROCEDURE — 36415 COLL VENOUS BLD VENIPUNCTURE: CPT | Performed by: INTERNAL MEDICINE

## 2022-12-07 NOTE — PROGRESS NOTES
Cardiology Office Note  MD Salomón Diaz MD Shayla Real, DO, MD Sampson Thomas DO, Archie Lang DO, [facility] [place]  ----------------------------------------------------------------  [address] [address]    Myrna Mcdaniel 33 y o  female MRN: [id number]  Unit/Bed#:  Encounter: [id number]      History of Present Illness: It was a pleasure to see Myrna Mcdaniel in the office today for follow-up CV evaluation  She has a past medical history of some form tachycardia, but she is unaware of the specifics  She established us in July 2021  Previously, she was seen by a cardiologist in the [facility]  Her cardiologist diagnosed her with "tachycardia "  She is unaware any diagnosis of supraventricular tachycardia or ventricular tachycardia  She reports that she has had tests many years ago including echocardiogram and Holter monitors which did not demonstrate any arrhythmia  She has continued to have palpitations on and off experiencing fluttering in her chest with the sensation of a rapid heart rate  She gets some associated lightheadedness  Palpitations seem to occur several times per week and lasts for seconds to minutes before they resolve  Denies family history of premature CAD or sudden cardiac death  She does state that her mother has diagnosis of some form cardiomyopathy  She is unaware of the specifics  She reports 1 episode of loss of consciousness in the past, but this was reportedly associated with carbon monoxide toxicity within the past year  In October 2020, she was seen in the emergency department at [facility] and her recurrent episodes of palpitations  She underwent CTA of the chest and there was no evidence of pulmonary embolism  Testing was unremarkable and she was found to be in sinus rhythm  She was subsequently discharged home   In June 2021, she underwent echocardiogram demonstrating mild right ventricular dilatation with mild tricuspid regurgitation  There was concern that this may be an overestimation of the right ventricular size and repeat echocardiogram was ordered  Also, with the patient's palpitations, we checked a event recorder monitor  Event recorder showed sinus rhythm with average heart rate in the 80s  There was no significant arrhythmia correlating with symptoms aside from a rare episode of nonsustained PSVT  She underwent cardiac MRI and is here today to discuss the results  She denies any chest pain, pressure, tightness or squeezing  Denies significant lightheadedness or dizziness  Admits to palpitations with tachycardia on occasion  Denies lower extremity swelling, orthopnea or paroxysmal nocturnal dyspnea  Review of Systems:  Review of Systems   Constitutional: Negative for decreased appetite, fever, weight gain and weight loss  HENT: Negative for congestion and sore throat  Eyes: Negative for visual disturbance  Cardiovascular: Positive for palpitations  Negative for chest pain, dyspnea on exertion, leg swelling and near-syncope  Respiratory: Negative for cough and shortness of breath  Hematologic/Lymphatic: Negative for bleeding problem  Skin: Negative for rash  Musculoskeletal: Negative for myalgias and neck pain  Gastrointestinal: Negative for abdominal pain and nausea  Neurological: Negative for light-headedness and weakness  Psychiatric/Behavioral: Negative for depression  Past Medical History:   Diagnosis Date   • Migraines    • Tachycardia        History reviewed  No pertinent surgical history      Social History     Socioeconomic History   • Marital status: /Civil Union     Spouse name: None   • Number of children: None   • Years of education: None   • Highest education level: None   Occupational History   • None   Tobacco Use   • Smoking status: Every Day     Types: Cigarettes   • Smokeless tobacco: Never   Vaping Use   • Vaping Use: Never used   Substance and Sexual Activity   • Alcohol use: Yes   • Drug use: Never   • Sexual activity: None   Other Topics Concern   • None   Social History Narrative   • None     Social Determinants of Health     Financial Resource Strain: Not on file   Food Insecurity: Not on file   Transportation Needs: Not on file   Physical Activity: Not on file   Stress: Not on file   Social Connections: Not on file   Intimate Partner Violence: Not on file   Housing Stability: Not on file       History reviewed  No pertinent family history  Allergies   Allergen Reactions   • Aspirin Other (See Comments)     "bruising only"           Current Outpatient Medications:   •  Magnesium 400 MG CAPS, Take by mouth, Disp: , Rfl:   •  triamcinolone (KENALOG) 0 1 % ointment, daily, Disp: , Rfl:   •  COLLAGEN-VITAMIN C PO, Take by mouth daily 3 tablets a day (Patient not taking: Reported on 8/12/2022), Disp: , Rfl:     Vitals:    12/08/22 1112   BP: 110/60   Pulse: 72   Weight: 70 3 kg (155 lb)   Height: 5' 6" (1 676 m)       PHYSICAL EXAMINATION:  Gen: Awake, Alert, NAD   Head/eyes: AT/NC, pupils equal and round, Anicteric  ENT: mmm  Neck: Supple, No elevated JVP, trachea midline  Resp: CTA bilaterally no w/r/r  CV: RRR +S1, S2, No m/r/g  Abd: Soft, NT/ND + BS  Ext: no LE edema bilaterally  Neuro: Follows commands, moves all extermities  Psych: Appropriate affect, happy mood, pleasant attitude, non-combative  Skin: warm; no rash, erythema or venous stasis changes on exposed skin    --------------------------------------------------------------------------------  TREADMILL STRESS  No results found for this or any previous visit      --------------------------------------------------------------------------------  NUCLEAR STRESS TEST: No results found for this or any previous visit      No results found for this or any previous visit       --------------------------------------------------------------------------------  CATH:  No results found for this or any previous visit     --------------------------------------------------------------------------------  ECHO:   No results found for this or any previous visit  No results found for this or any previous visit     --------------------------------------------------------------------------------  HOLTER  No results found for this or any previous visit  No results found for this or any previous visit     --------------------------------------------------------------------------------  CAROTIDS  No results found for this or any previous visit      --------------------------------------------------------------------------------  ECGs:  Results for orders placed or performed in visit on 12/08/22   POCT ECG    Impression    Sinus rhythm 72 bpm, normal ECG        Lab Results   Component Value Date    WBC 7 16 12/01/2022    HGB 12 3 12/01/2022    HCT 37 7 12/01/2022    MCV 95 12/01/2022     12/01/2022      Lab Results   Component Value Date    SODIUM 135 12/01/2022    K 3 8 12/01/2022     12/01/2022    CO2 25 12/01/2022    BUN 9 12/01/2022    CREATININE 0 46 (L) 12/01/2022    CALCIUM 9 4 12/01/2022      No results found for: HGBA1C   No results found for: CHOL  No results found for: HDL  No results found for: LDLCALC  No results found for: TRIG  No results found for: CHOLHDL   No results found for: INR, PROTIME     1  Lightheadedness  -     POCT ECG    2  Tachycardia  -     POCT ECG    3  Right ventricular dilation  -     POCT ECG    4  PSVT (paroxysmal supraventricular tachycardia) (HCC)  -     POCT ECG    5   Moderate tricuspid regurgitation  -     POCT ECG      IMPRESSION:  Lightheadedness  Sinus tachycardia  LVEF 55-60%, mild RV dilatation with normal function, trace MR, mild TR, trivial pericardial effusion, June 2021  Mild RV dilatation with preserved function, mild to moderate TR, trace NM w/ PASP 31 mmHg by echo, July 2021  CTA chest, negative for pulmonary embolism, November 2020  Cardiac MRI without evidence of LGE/ARVD, August 2022  Palpitations  Event recorder w/ SR avg HR 84 bpm, rare APCs/couplets, rare VPCs/couplets, nonsustained PSVT  (x2) longest/fastest was 5 beats at 154 bpm, triggered event correlate with sinus rhythm to sinus tachycardia and rarely with nonsustained PSVT, August 2021  Family history of CVD in mother    PLAN:  It was a pleasure to see Shan Arora in the office today for follow-up CV evaluation  She is here today for routine CV follow-up  Since her last encounter, she underwent cardiac MRI  Cardiac MRI demonstrated no evidence of late gadolinium enhancement or evidence of ARVD  She has no symptoms concerning for angina and no signs or symptoms of heart failure  She examines to be euvolemic in the office today  Blood pressure and heart rate are currently stable  She has been tolerating her current medications without any reported adverse effects  ECG is nonischemic  She can perform greater than 4 METS on a daily basis without significant exertional symptoms  Based on her clinical presentation, I have the following recommendations:    1  Recommend the patient drink 2 to 2 5 L of water on a daily basis to stay better hydrated  2  Would encourage 30 minutes a day, 5 days a week of moderate intensity activity to build cardiovascular endurance  3  Recommend heart healthy diet low in carbohydrate  4   Lab work has been unremarkable  5  No changes to her medications at this time and no additional CV testing for now  6   Encourage increased sleeping during the evenings and stress management  7   We will follow-up with her in 3 months to reassess her progress  Is always, please not hesitate to call with any questions  Portions of the record may have been created with voice recognition software   Occasional wrong word or "sound a like" substitutions may have occurred due to the inherent limitations of voice recognition software  Read the chart carefully and recognize, using context, where substitutions have occurred        Signed: Dio Vega DO, [address], [place]

## 2022-12-08 ENCOUNTER — OFFICE VISIT (OUTPATIENT)
Dept: CARDIOLOGY CLINIC | Facility: CLINIC | Age: 32
End: 2022-12-08

## 2022-12-08 VITALS
WEIGHT: 155 LBS | HEART RATE: 72 BPM | DIASTOLIC BLOOD PRESSURE: 60 MMHG | SYSTOLIC BLOOD PRESSURE: 110 MMHG | BODY MASS INDEX: 24.91 KG/M2 | HEIGHT: 66 IN

## 2022-12-08 DIAGNOSIS — R42 LIGHTHEADEDNESS: Primary | ICD-10-CM

## 2022-12-08 DIAGNOSIS — R00.0 TACHYCARDIA: ICD-10-CM

## 2022-12-08 DIAGNOSIS — I51.7 RIGHT VENTRICULAR DILATION: ICD-10-CM

## 2022-12-08 DIAGNOSIS — I47.1 PSVT (PAROXYSMAL SUPRAVENTRICULAR TACHYCARDIA) (HCC): ICD-10-CM

## 2022-12-08 DIAGNOSIS — I07.1 MODERATE TRICUSPID REGURGITATION: ICD-10-CM

## 2023-08-24 ENCOUNTER — OFFICE VISIT (OUTPATIENT)
Dept: VASCULAR SURGERY | Facility: CLINIC | Age: 33
End: 2023-08-24
Payer: COMMERCIAL

## 2023-08-24 VITALS
HEART RATE: 75 BPM | DIASTOLIC BLOOD PRESSURE: 82 MMHG | WEIGHT: 160 LBS | BODY MASS INDEX: 25.71 KG/M2 | HEIGHT: 66 IN | SYSTOLIC BLOOD PRESSURE: 120 MMHG | OXYGEN SATURATION: 100 %

## 2023-08-24 DIAGNOSIS — I83.813 VARICOSE VEINS OF BILATERAL LOWER EXTREMITIES WITH PAIN: ICD-10-CM

## 2023-08-24 PROCEDURE — 99243 OFF/OP CNSLTJ NEW/EST LOW 30: CPT | Performed by: NURSE PRACTITIONER

## 2023-08-24 RX ORDER — BENZONATATE 100 MG/1
100 CAPSULE ORAL 3 TIMES DAILY PRN
COMMUNITY
Start: 2023-07-13

## 2023-08-24 NOTE — PROGRESS NOTES
Assessment/Plan:    Varicose veins of bilateral lower extremities with pain  29-year-old Palestinian speaking female with migraines, bilateral lower extremity spider veins presents for vascular evaluation    -Bilateral lower extremity spider/reticular veins  -Discussed treatment for spider veins as injection sclerotherapy. Discussed this is an out-of-pocket cost, need to wear compression socks after injections, multiple sessions may be needed, risk of a permanent hyperpigmentation  -She does have significant heaviness and discomfort in bilateral lower extremities with long periods of standing  -Will evaluate with venous reflux study for underlying venous insufficiency  -Advised to start wearing compression socks for symptomatic relief of heaviness and would need trial of compression prior to any intervention if underlying venous insufficiency  -Rx 20-30mmHg compression given   -Follow-up after duplex to review        Diagnoses and all orders for this visit:    Varicose veins of bilateral lower extremities with pain  -     Ambulatory Referral to Vascular Surgery  -     Compression Stocking  -     VAS reflux lower limb venous duplex study with reflux assessment, complete bilateral; Future    Other orders  -     benzonatate (TESSALON PERLES) 100 mg capsule; Take 100 mg by mouth 3 (three) times a day as needed          Subjective:      Patient ID: Tree Parra is a 29 y.o. female. New patient presents for evaluation of bilateral LE spider veins accompanied by pain, discoloration and swelling for the past year. She states veins are tender to touch and she often feels like her legs are itchy and heavy. Pt has not been wearing compression or elevating her legs. She is a former smoker. HPI  29[facility] [address] [facility] [address] [facility] female with migraines, bilateral lower extremity spider veins presents for vascular evaluation. She has bilateral lower extremity spider/reticular veins. No significantly large truncal varicosities. She would like to treat veins. She does complain of tenderness, heaviness, discomfort in the lower extremities with long periods of standing.  used for translation   The following portions of the patient's history were reviewed and updated as appropriate: allergies, current medications, past family history, past medical history, past social history, past surgical history and problem list.  ROS reviewed     Review of Systems   Cardiovascular: Positive for leg swelling. Skin: Positive for color change. All other systems reviewed and are negative. Objective:  I have reviewed and made appropriate changes to the review of systems input by the medical assistant. Vitals:    08/24/23 1542   BP: 120/82   BP Location: Left arm   Patient Position: Sitting   Cuff Size: Standard   Pulse: 75   SpO2: 100%   Weight: 72.6 kg (160 lb)   Height: 5' 6" (1.676 m)       Patient Active Problem List   Diagnosis   • Varicose veins of bilateral lower extremities with pain       No past surgical history on file. No family history on file. Social History     Socioeconomic History   • Marital status: /Civil Union     Spouse name: Not on file   • Number of children: Not on file   • Years of education: Not on file   • Highest education level: Not on file   Occupational History   • Not on file   Tobacco Use   • Smoking status: Former     Types: Cigarettes   • Smokeless tobacco: Never   Vaping Use   • Vaping Use: Never used   Substance and Sexual Activity   • Alcohol use:  Yes   • Drug use: Never   • Sexual activity: Not on file   Other Topics Concern   • Not on file   Social History Narrative   • Not on file     Social Determinants of Health     Financial Resource Strain: Not on file   Food Insecurity: Not on file   Transportation Needs: Not on file   Physical Activity: Not on file   Stress: Not on file   Social Connections: Not on file   Intimate Partner Violence: Not on file   Housing Stability: Not on file       Allergies   Allergen Reactions   • Aspirin Other (See Comments)     "bruising only"           Current Outpatient Medications:   •  triamcinolone (KENALOG) 0.1 % ointment, daily, Disp: , Rfl:   •  benzonatate (TESSALON PERLES) 100 mg capsule, Take 100 mg by mouth 3 (three) times a day as needed, Disp: , Rfl:   •  COLLAGEN-VITAMIN C PO, Take by mouth daily 3 tablets a day (Patient not taking: Reported on 8/12/2022), Disp: , Rfl:   •  Magnesium 400 MG CAPS, Take by mouth (Patient not taking: Reported on 8/24/2023), Disp: , Rfl:       /82 (BP Location: Left arm, Patient Position: Sitting, Cuff Size: Standard)   Pulse 75   Ht 5' 6" (1.676 m)   Wt 72.6 kg (160 lb)   SpO2 100%   BMI 25.82 kg/m²          Physical Exam  Vitals and nursing note reviewed. Constitutional:       Appearance: She is well-developed. HENT:      Head: Normocephalic and atraumatic. Eyes:      Extraocular Movements: Extraocular movements intact. Cardiovascular:      Pulses:           Dorsalis pedis pulses are 2+ on the right side and 2+ on the left side. Heart sounds: Normal heart sounds. Pulmonary:      Effort: Pulmonary effort is normal.      Breath sounds: Normal breath sounds. Abdominal:      General: Bowel sounds are normal.      Palpations: Abdomen is soft. Musculoskeletal:         General: No swelling. Normal range of motion. Cervical back: Neck supple. Comments: Diffusely scattered bilateral lower extremity spider/reticular veins. No large truncal varicosities. Skin:     General: Skin is warm. Neurological:      Mental Status: She is alert and oriented to person, place, and time. Psychiatric:         Behavior: Behavior normal.         Thought Content:  Thought content normal.

## 2023-08-24 NOTE — PATIENT INSTRUCTIONS
Várices   CUIDADO AMBULATORIO:   Várices son Kennis Calos que se engrandecen, tuercen e hinchan. Las venas varicosas comúnmente aparecen en la parte trasera de racquel pantorrillas, rodillas y muslos. Las várices se [address] no funcionan adecuadamente. [place] causa que la akin se acumule y aumente la presión en las venas de racquel piernas. El aumento de presión causa que racquel venas se estiren, engrandezcan, hinchen y se tuerzan. Los síntomas más comunes [place] siguientes: Racquel síntomas pueden empeorar después de clem estado de pie o sentado por largos periodos de [place]. Puede presentar cualquiera de los siguientes signos o síntomas:  Venas azules, moradas o sobresalientes en racquel piernas    Dolor, hinchazón o calambres musculares en racquel piernas    Sentir fatiga o pesadez en las piernas    [employer] en las piernas    Busque atención médica de inmediato si:  Usted tiene ketan herida que no percy o está infectada. Usted tiene ketan lesión que agrietó guthrie piel y provocó que las venas varicosas sangraran. Guthrie pierna está inflamada y dura. Usted nota que racquel piernas o pies se están poniendo azulados o ennegrecidos. Guthrie pierna se siente cálida, sensible y [place]. Se podría desiree inflamado y carlos. Comuníquese con guthrie médico si:  Usted tiene dolor en guthrie pierna que no desaparece o que empeora. Nota grandes moretones repentinos en las piernas. Usted tiene sarpullido en guthrie pierna. Racquel síntomas le impiden realizar racquel actividades diarias. Usted tiene preguntas o inquietudes acerca de guthrie condición o cuidado. El tratamiento para las venas varicosas tiene keny propósito disminuir síntomas, mejorar la apariencia y prevenir más problemas. El tratamiento dependerá de cuáles venas son afectadas y la severidad de guthrie condición en cada ketan de racquel venas afectadas. Usted puede necesitar procedimientos para tratar o eliminar las venas varicosas.  Es probable que guthrie médico le [employer] solución o use un láser para cerrar las venas varicosas. También se puede hacer ketan cirugía para quitar venas largas. [place]dale a guthrie médico más información acerca de los procedimientos utilizados para tratar venas varicosas. [address] várices:  No se siente o esté de pie por largos periodos de tiempo. [place] puede causar que la akin se acumule en natalie piernas y que natalie síntomas empeoren. Doble o gire natalie tobillos varias veces cada hora. [place] por varios minutos cada hora para que la akin en natalie piernas se [place]. No cruce natalie piernas cuando se siente. [place] disminuye el flujo sanguíneo a natalie pies y [employer]. No use ropa o zapatos ajustados. No use zapatos de tacones altos. No use ropa que [place] alrededor de la cintura o las rodillas. Mantenga un peso saludable. Guthrie sobrepeso u obesidad puede empeorar tus varices. Consulte con guthrie médico cuánto debería pesar. [place] que lo ayude a crear un plan para bajar de peso si tiene sobrepeso. Use medias de compresión keny se le indique. Las medias son ajustadas y ejercen presión a natalie piernas. Mejoran el flujo sanguíneo y [place] [facility] a prevenir coágulos sanguíneos. Eleve natalie piernas. Manténgalas a un nivel por encima de guthrie corazón por 15 a 30 minutos varias veces al día. También puede apuntalar el extremo de guthrie cama ligeramente para elevar las piernas mientras duerme. [place] ayuda a que la akin fluya de regreso hacia guthrie corazón. Ejercítese regularmente. Consulte con guthrie médico acerca de cuál es el mejor régimen de ejercicio para usted. El ejercicio puede mejorar el flujo sanguíneo a las piernas y los pies. Acuda a la consulta de control con guthrie médico según las indicaciones: Anote natalie preguntas para que se acuerde de hacerlas drake natalie visitas. © Copyright [employer] 2022 Information is for End User's use only and may not be sold, redistributed or otherwise used for commercial purposes.   Esta información es sólo para uso en educación. Guthrie intención no es darle un consejo médico sobre enfermedades o tratamientos. Colsulte con guthrie Olegin Nagi farmacéutico antes de seguir cualquier régimen médico para saber si es seguro y efectivo para usted.

## 2023-09-28 NOTE — ASSESSMENT & PLAN NOTE
29-year-old [address] speaking female with migraines, bilateral lower extremity spider veins presents for vascular evaluation    -Bilateral lower extremity spider/reticular veins  -Discussed treatment for spider veins as injection sclerotherapy.  Discussed this is an out-of-pocket cost, need to wear compression socks after injections, multiple sessions may be needed, risk of a permanent hyperpigmentation  -She does have significant heaviness and discomfort in bilateral lower extremities with long periods of standing  -Will evaluate with venous reflux study for underlying venous insufficiency  -Advised to start wearing compression socks for symptomatic relief of heaviness and would need trial of compression prior to any intervention if underlying venous insufficiency  -Rx 20-30mmHg compression given   -Follow-up after duplex to review   -Photos on chart